# Patient Record
Sex: FEMALE | Race: BLACK OR AFRICAN AMERICAN | URBAN - METROPOLITAN AREA
[De-identification: names, ages, dates, MRNs, and addresses within clinical notes are randomized per-mention and may not be internally consistent; named-entity substitution may affect disease eponyms.]

---

## 2022-10-22 ENCOUNTER — HOSPITAL ENCOUNTER (OUTPATIENT)
Facility: HOSPITAL | Age: 22
Discharge: HOME | End: 2022-10-22
Attending: OBSTETRICS & GYNECOLOGY | Admitting: OBSTETRICS & GYNECOLOGY
Payer: MEDICAID

## 2022-10-22 VITALS — DIASTOLIC BLOOD PRESSURE: 79 MMHG | HEART RATE: 102 BPM | SYSTOLIC BLOOD PRESSURE: 133 MMHG

## 2022-10-22 LAB
ALBUMIN SERPL-MCNC: 2.6 G/DL (ref 3.4–5)
ALBUMIN/GLOB SERPL: 0.7 {RATIO} (ref 1–2)
ALP LIVER SERPL-CCNC: 80 U/L
ALT SERPL-CCNC: 15 U/L
ANION GAP SERPL CALC-SCNC: 10 MMOL/L (ref 0–18)
AST SERPL-CCNC: 8 U/L (ref 15–37)
BASOPHILS # BLD AUTO: 0.02 X10(3) UL (ref 0–0.2)
BASOPHILS NFR BLD AUTO: 0.3 %
BILIRUB SERPL-MCNC: 0.4 MG/DL (ref 0.1–2)
BILIRUB UR QL: NEGATIVE
BUN BLD-MCNC: 3 MG/DL (ref 7–18)
BUN/CREAT SERPL: 5.4 (ref 10–20)
CALCIUM BLD-MCNC: 8.6 MG/DL (ref 8.5–10.1)
CHLORIDE SERPL-SCNC: 108 MMOL/L (ref 98–112)
CLARITY UR: CLEAR
CO2 SERPL-SCNC: 19 MMOL/L (ref 21–32)
COLOR UR: YELLOW
CREAT BLD-MCNC: 0.56 MG/DL
CREAT UR-SCNC: 28.8 MG/DL
DEPRECATED RDW RBC AUTO: 39.8 FL (ref 35.1–46.3)
EOSINOPHIL # BLD AUTO: 0.07 X10(3) UL (ref 0–0.7)
EOSINOPHIL NFR BLD AUTO: 1.1 %
ERYTHROCYTE [DISTWIDTH] IN BLOOD BY AUTOMATED COUNT: 13.9 % (ref 11–15)
FASTING STATUS PATIENT QL REPORTED: NO
GFR SERPLBLD BASED ON 1.73 SQ M-ARVRAT: 132 ML/MIN/1.73M2 (ref 60–?)
GLOBULIN PLAS-MCNC: 4 G/DL (ref 2.8–4.4)
GLUCOSE BLD-MCNC: 103 MG/DL (ref 70–99)
GLUCOSE UR-MCNC: 50 MG/DL
HCT VFR BLD AUTO: 31.6 %
HGB BLD-MCNC: 10.1 G/DL
HGB UR QL STRIP.AUTO: NEGATIVE
IMM GRANULOCYTES # BLD AUTO: 0.01 X10(3) UL (ref 0–1)
IMM GRANULOCYTES NFR BLD: 0.2 %
KETONES UR-MCNC: NEGATIVE MG/DL
LYMPHOCYTES # BLD AUTO: 1.67 X10(3) UL (ref 1–4)
LYMPHOCYTES NFR BLD AUTO: 25.1 %
MCH RBC QN AUTO: 25.1 PG (ref 26–34)
MCHC RBC AUTO-ENTMCNC: 32 G/DL (ref 31–37)
MCV RBC AUTO: 78.4 FL
MONOCYTES # BLD AUTO: 0.61 X10(3) UL (ref 0.1–1)
MONOCYTES NFR BLD AUTO: 9.2 %
NEUTROPHILS # BLD AUTO: 4.27 X10 (3) UL (ref 1.5–7.7)
NEUTROPHILS # BLD AUTO: 4.27 X10(3) UL (ref 1.5–7.7)
NEUTROPHILS NFR BLD AUTO: 64.1 %
NITRITE UR QL STRIP.AUTO: NEGATIVE
OSMOLALITY SERPL CALC.SUM OF ELEC: 281 MOSM/KG (ref 275–295)
PH UR: 7 [PH] (ref 5–8)
PLATELET # BLD AUTO: 298 10(3)UL (ref 150–450)
POTASSIUM SERPL-SCNC: 3.4 MMOL/L (ref 3.5–5.1)
PROT SERPL-MCNC: 6.6 G/DL (ref 6.4–8.2)
PROT UR-MCNC: 6.2 MG/DL
PROT UR-MCNC: NEGATIVE MG/DL
PROT/CREAT UR-RTO: 0.22
RBC # BLD AUTO: 4.03 X10(6)UL
SODIUM SERPL-SCNC: 137 MMOL/L (ref 136–145)
SP GR UR STRIP: 1 (ref 1–1.03)
UROBILINOGEN UR STRIP-ACNC: <2
VIT C UR-MCNC: NEGATIVE MG/DL
WBC # BLD AUTO: 6.7 X10(3) UL (ref 4–11)

## 2022-10-22 PROCEDURE — 59025 FETAL NON-STRESS TEST: CPT | Performed by: OBSTETRICS & GYNECOLOGY

## 2022-10-22 RX ORDER — ACETAMINOPHEN 500 MG
1000 TABLET ORAL ONCE
Status: COMPLETED | OUTPATIENT
Start: 2022-10-22 | End: 2022-10-22

## 2022-10-22 RX ORDER — ACETAMINOPHEN 500 MG
TABLET ORAL
Status: DISCONTINUED
Start: 2022-10-22 | End: 2022-10-22

## 2022-10-22 RX ORDER — CHOLECALCIFEROL (VITAMIN D3) 25 MCG
1 TABLET,CHEWABLE ORAL DAILY
COMMUNITY

## 2022-10-22 RX ORDER — ASPIRIN 81 MG/1
81 TABLET ORAL DAILY
COMMUNITY

## 2022-10-22 NOTE — PROGRESS NOTES
Pt is a 25year old female admitted to TR1/TR1-A. Patient presents with:   Complication: c/o sharp constant right sided abdominal pain since 10:00 today. Denies vaginal bleeding or LOF. Denies nausea, vomiting or diarrhea. Denies urinary symptoms. States less fetal movement today. Pt is  25w2d intra-uterine pregnancy. History obtained, consents signed. Oriented to room, staff, and plan of care.

## 2022-10-23 NOTE — TRIAGE
[facility] - [facility]      Triage Note    Domingo Valente Patient Status:  Outpatient    6/10/2000 MRN [id number]   Location [address] Attending Christal Merida MD   Hosp Day # 0 PCP No primary care provider on file.       Para:   Estimated Date of Delivery: 23  Gestation: 25w2d    Chief Complaint      Complication          Allergies:  No Known Allergies    Orders Placed This Encounter      Comp Metabolic Panel (14)      CBC With Differential With Platelet      Protein/Creatinine Ratio, Urine Random      Urinalysis with Culture Reflex      Urine Culture, Routine      Lab Results   Component Value Date    WBC 6.7 10/22/2022    HGB 10.1 (L) 10/22/2022    HCT 31.6 (L) 10/22/2022    .0 10/22/2022    CREATSERUM 0.56 10/22/2022    BUN 3 (L) 10/22/2022     10/22/2022    K 3.4 (L) 10/22/2022     10/22/2022    CO2 19.0 (L) 10/22/2022     (H) 10/22/2022    CA 8.6 10/22/2022    ALB 2.6 (L) 10/22/2022    ALKPHO 80 10/22/2022    BILT 0.4 10/22/2022    TP 6.6 10/22/2022    AST 8 (L) 10/22/2022    ALT 15 10/22/2022       Clinitek UA  Lab Results   Component Value Date    GLUUR 50  (A) 10/22/2022    SPECGRAVITY 1.004 10/22/2022       UA  Lab Results   Component Value Date    COLORUR Yellow 10/22/2022    CLARITY Clear 10/22/2022    SPECGRAVITY 1.004 10/22/2022    PROUR Negative 10/22/2022    GLUUR 50  (A) 10/22/2022    KETUR Negative 10/22/2022    BILUR Negative 10/22/2022    BLOODURINE Negative 10/22/2022    NITRITE Negative 10/22/2022    UROBILINOGEN <2.0 10/22/2022    LEUUR Large (A) 10/22/2022    UASA Negative 10/22/2022        10/22/22  1700 10/22/22  1713 10/22/22  1845   BP: (!) 141/92 136/72 133/79   Pulse: 111 103 102       NST  Variability: Moderate           Accelerations: Yes           Decelerations: None            Baseline: 150 BPM           Uterine Irritability: No           Contractions: Not present Acoustic Stimulator: No           Nonstress Test Interpretation: Appropriate for gestational age           Nonstress Test Second Interpretation: Appropriate for gestational age                        Additional Comments       Patient presents with:   Complication: c/o sharp constant right sided abdominal pain since 10:00 today. Denies vaginal bleeding or LOF. Denies nausea, vomiting or diarrhea. Denies urinary symptoms. States less fetal movement today. EFM tracing appropriate for gestational age. Labs, BP's and EFM tracing reviewed by Dr Madisyn Bradshaw. Tylenol 100 mgm po given. Dr Madisyn Bradshaw in to speak with pt. Urine culture pending. Pt informed if culture positive she would be contacted. Pt instructed to follow up with own OB if pain persists. Pt states understanding. Discharged to home. Pushpa Thacker RN  10/22/2022 7:12 PM    NST Reactive. I agree with the above stated findings. Round Ligament pain. Kevin Bradshaw MD  On call Laborist 10/22/22